# Patient Record
Sex: FEMALE | Race: WHITE | NOT HISPANIC OR LATINO | URBAN - METROPOLITAN AREA
[De-identification: names, ages, dates, MRNs, and addresses within clinical notes are randomized per-mention and may not be internally consistent; named-entity substitution may affect disease eponyms.]

---

## 2024-01-31 ENCOUNTER — LAB OUTSIDE AN ENCOUNTER (OUTPATIENT)
Dept: URBAN - METROPOLITAN AREA CLINIC 105 | Facility: CLINIC | Age: 84
End: 2024-01-31

## 2024-01-31 ENCOUNTER — OFFICE VISIT (OUTPATIENT)
Dept: URBAN - METROPOLITAN AREA CLINIC 105 | Facility: CLINIC | Age: 84
End: 2024-01-31
Payer: COMMERCIAL

## 2024-01-31 ENCOUNTER — TELEPHONE ENCOUNTER (OUTPATIENT)
Dept: URBAN - METROPOLITAN AREA CLINIC 105 | Facility: CLINIC | Age: 84
End: 2024-01-31

## 2024-01-31 VITALS
SYSTOLIC BLOOD PRESSURE: 115 MMHG | WEIGHT: 107.8 LBS | DIASTOLIC BLOOD PRESSURE: 77 MMHG | HEIGHT: 63 IN | BODY MASS INDEX: 19.1 KG/M2 | HEART RATE: 80 BPM | TEMPERATURE: 97.1 F

## 2024-01-31 DIAGNOSIS — R10.13 DYSPEPSIA: ICD-10-CM

## 2024-01-31 DIAGNOSIS — R11.0 NAUSEA: ICD-10-CM

## 2024-01-31 DIAGNOSIS — R18.8 OTHER ASCITES: ICD-10-CM

## 2024-01-31 DIAGNOSIS — R63.0 LOSS OF APPETITE: ICD-10-CM

## 2024-01-31 DIAGNOSIS — R13.19 ESOPHAGEAL DYSPHAGIA: ICD-10-CM

## 2024-01-31 DIAGNOSIS — R14.0 BLOATING: ICD-10-CM

## 2024-01-31 DIAGNOSIS — K76.6 PORTAL HYPERTENSION: ICD-10-CM

## 2024-01-31 DIAGNOSIS — K74.69 OTHER CIRRHOSIS OF LIVER: ICD-10-CM

## 2024-01-31 PROBLEM — 389026000: Status: ACTIVE | Noted: 2024-01-31

## 2024-01-31 PROBLEM — 79890006: Status: ACTIVE | Noted: 2024-01-31

## 2024-01-31 PROBLEM — 34742003: Status: ACTIVE | Noted: 2024-01-31

## 2024-01-31 PROBLEM — 19943007: Status: ACTIVE | Noted: 2024-01-31

## 2024-01-31 PROCEDURE — 99205 OFFICE O/P NEW HI 60 MIN: CPT | Performed by: INTERNAL MEDICINE

## 2024-01-31 RX ORDER — ONDANSETRON 4 MG/1
1 TABLET TABLET, ORALLY DISINTEGRATING ORAL
Qty: 30 | Refills: 3 | OUTPATIENT
Start: 2024-02-01

## 2024-01-31 NOTE — HPI-TODAY'S VISIT:
this delightful 83-year-old lady comes to see me today in our [place] office on the kind referral from Dr. Tracy Chinchilla. the patient is already seen and is established with another gastroenterologist, Dr. Randall.  She has a gastroenterologist.  It is unclear why she comes to see me and has driven all the way from [facility] to do so today.  The story begins in December when she woke up 1 morning and had abdominal distention and pressure.  This went on for several days up to a week.  She eventually went on to have a CT scan of the abdomen and pelvis.  This showed evidence of chronic liver disease and cirrhosis with stigmata of portal hypertension and large volume ascites.  She was then referred to Dr. Randall who did a serological workup negative for autoimmune, viral, and inherited liver diseases.  Patient was started on 25 mg of spironolactone twice daily. -   The patient reports and her  confirms that she has been a lifelong non drinker.  She has never been morbidly obese, hyperlipidemic, or a diabetic.  She further denies any family history of digestive or liver problems.  She does endorse having problems with nausea here lately to the point that she is unable to eat.  She feels like sometimes food gets stuck in the esophagus on the way down.

## 2024-02-01 LAB
HEPATITIS A AB, TOTAL: (no result)
HEPATITIS B SURFACE ANTIBODY QL: (no result)

## 2024-02-02 ENCOUNTER — LAB (OUTPATIENT)
Dept: RURAL CLINIC 8 | Facility: CLINIC | Age: 84
End: 2024-02-02

## 2024-02-09 ENCOUNTER — OV EP (OUTPATIENT)
Dept: RURAL CLINIC 8 | Facility: CLINIC | Age: 84
End: 2024-02-09
Payer: COMMERCIAL

## 2024-02-09 ENCOUNTER — LAB (OUTPATIENT)
Dept: RURAL CLINIC 8 | Facility: CLINIC | Age: 84
End: 2024-02-09

## 2024-02-09 VITALS
SYSTOLIC BLOOD PRESSURE: 130 MMHG | WEIGHT: 95 LBS | HEART RATE: 63 BPM | TEMPERATURE: 98.1 F | HEIGHT: 63 IN | BODY MASS INDEX: 16.83 KG/M2 | DIASTOLIC BLOOD PRESSURE: 76 MMHG

## 2024-02-09 DIAGNOSIS — Z86.010 HISTORY OF COLON POLYPS: ICD-10-CM

## 2024-02-09 DIAGNOSIS — R63.0 LOSS OF APPETITE: ICD-10-CM

## 2024-02-09 DIAGNOSIS — K74.60 UNSPECIFIED CIRRHOSIS OF LIVER: ICD-10-CM

## 2024-02-09 DIAGNOSIS — R68.81 EARLY SATIETY: ICD-10-CM

## 2024-02-09 DIAGNOSIS — K76.6 PORTAL HYPERTENSION: ICD-10-CM

## 2024-02-09 DIAGNOSIS — R18.8 OTHER ASCITES: ICD-10-CM

## 2024-02-09 PROBLEM — 19943007: Status: ACTIVE | Noted: 2024-02-09

## 2024-02-09 PROBLEM — 428283002: Status: ACTIVE | Noted: 2024-02-09

## 2024-02-09 PROCEDURE — 99214 OFFICE O/P EST MOD 30 MIN: CPT | Performed by: INTERNAL MEDICINE

## 2024-02-09 RX ORDER — DORZOLAMIDE HYDROCHLORIDE AND TIMOLOL MALEATE 20; 5 MG/ML; MG/ML
1 DROP INTO AFFECTED EYE SOLUTION/ DROPS OPHTHALMIC TWICE A DAY
Status: ACTIVE | COMMUNITY

## 2024-02-09 RX ORDER — SPIRONOLACTONE 25 MG/1
1 TABLET TABLET, FILM COATED ORAL
Status: ACTIVE | COMMUNITY

## 2024-02-09 RX ORDER — TRIAMCINOLONE ACETONIDE 1 MG/G
1 APPLICATION CREAM TOPICAL
Status: ACTIVE | COMMUNITY

## 2024-02-09 NOTE — HPI-TODAY'S VISIT:
this delightful 83-year-old lady comes to see me today in our [place] office on the kind referral from Dr. Tracy Chinchilla. the patient is already seen and is established with another gastroenterologist, Dr. Randall.  She has a gastroenterologist.  It is unclear why she comes to see me and has driven all the way from [facility] to do so today.  The story begins in December when she woke up 1 morning and had abdominal distention and pressure.  This went on for several days up to a week.  She eventually went on to have a CT scan of the abdomen and pelvis.  This showed evidence of chronic liver disease and cirrhosis with stigmata of portal hypertension and large volume ascites.  She was then referred to Dr. Randall who did a serological workup negative for autoimmune, viral, and inherited liver diseases.  Patient was started on 25 mg of spironolactone twice daily. -   The patient reports and her  confirms that she has been a lifelong non drinker.  She has never been morbidly obese, hyperlipidemic, or a diabetic.  She further denies any family history of digestive or liver problems.  She does endorse having problems with nausea here lately to the point that she is unable to eat.  She feels like sometimes food gets stuck in the esophagus on the way down. - 2/9/2024: patient comes for follow-up with her .  She recently had paracentesis removing 3 L of fluid.  Fluid studies interestingly were not consistent with portal hypertension her SAAG was 0.9.  Her total protein in the fluid was elevated.  Both of these things are not consistent with portal hypertension as the etiology of her ascites.  She has been started on spironolactone daily.  She does not think she has got much reaccumulation of the fluid.  She says her bowels are still kind of slow.  She stopped taking the MiraLax for a while.  Also having some issues with loss of appetite and early satiety

## 2024-02-20 ENCOUNTER — EGD (OUTPATIENT)
Dept: RURAL MEDICAL CENTER 4 | Facility: MEDICAL CENTER | Age: 84
End: 2024-02-20

## 2024-02-20 ENCOUNTER — LAB (OUTPATIENT)
Dept: RURAL MEDICAL CENTER 4 | Facility: MEDICAL CENTER | Age: 84
End: 2024-02-20

## 2024-02-20 DIAGNOSIS — R63.4 UNINTENDED WEIGHT LOSS: ICD-10-CM

## 2024-02-20 DIAGNOSIS — R10.84 GENERALIZED ABDOMINAL PAIN: ICD-10-CM

## 2024-02-20 DIAGNOSIS — R18.8 OTHER ASCITES: ICD-10-CM

## 2024-02-20 DIAGNOSIS — R63.0 ANOREXIA: ICD-10-CM

## 2024-02-20 DIAGNOSIS — R11.0 NAUSEA: ICD-10-CM

## 2024-02-20 PROBLEM — 79890006: Status: ACTIVE | Noted: 2024-02-20

## 2024-02-20 RX ORDER — DORZOLAMIDE HYDROCHLORIDE AND TIMOLOL MALEATE 20; 5 MG/ML; MG/ML
1 DROP INTO AFFECTED EYE SOLUTION/ DROPS OPHTHALMIC TWICE A DAY
Status: ACTIVE | COMMUNITY

## 2024-02-20 RX ORDER — MECLIZINE HYDROCHLORIDE 25 MG/1
1 TABLET TABLET, CHEWABLE ORAL
Qty: 30 | Refills: 3 | Status: ACTIVE | COMMUNITY
Start: 2024-02-16

## 2024-02-20 RX ORDER — SPIRONOLACTONE 25 MG/1
1 TABLET TABLET, FILM COATED ORAL
Status: ACTIVE | COMMUNITY

## 2024-02-20 RX ORDER — TRIAMCINOLONE ACETONIDE 1 MG/G
1 APPLICATION CREAM TOPICAL
Status: ACTIVE | COMMUNITY

## 2024-03-15 ENCOUNTER — OV EP (OUTPATIENT)
Dept: RURAL CLINIC 8 | Facility: CLINIC | Age: 84
End: 2024-03-15
Payer: COMMERCIAL

## 2024-03-15 ENCOUNTER — LAB (OUTPATIENT)
Dept: RURAL CLINIC 8 | Facility: CLINIC | Age: 84
End: 2024-03-15

## 2024-03-15 VITALS
TEMPERATURE: 97.7 F | HEIGHT: 63 IN | WEIGHT: 103 LBS | SYSTOLIC BLOOD PRESSURE: 117 MMHG | HEART RATE: 70 BPM | DIASTOLIC BLOOD PRESSURE: 83 MMHG | BODY MASS INDEX: 18.25 KG/M2

## 2024-03-15 DIAGNOSIS — K74.69 OTHER CIRRHOSIS OF LIVER: ICD-10-CM

## 2024-03-15 DIAGNOSIS — R63.0 LOSS OF APPETITE: ICD-10-CM

## 2024-03-15 DIAGNOSIS — R18.8 OTHER ASCITES: ICD-10-CM

## 2024-03-15 DIAGNOSIS — R11.0 NAUSEA: ICD-10-CM

## 2024-03-15 DIAGNOSIS — K76.6 PORTAL HYPERTENSION: ICD-10-CM

## 2024-03-15 DIAGNOSIS — K59.09 CHRONIC CONSTIPATION: ICD-10-CM

## 2024-03-15 DIAGNOSIS — R14.0 ABDOMINAL DISTENTION: ICD-10-CM

## 2024-03-15 PROCEDURE — 99215 OFFICE O/P EST HI 40 MIN: CPT | Performed by: INTERNAL MEDICINE

## 2024-03-15 RX ORDER — SPIRONOLACTONE 25 MG/1
1 TABLET TABLET, FILM COATED ORAL
Status: ACTIVE | COMMUNITY

## 2024-03-15 RX ORDER — DORZOLAMIDE HYDROCHLORIDE AND TIMOLOL MALEATE 20; 5 MG/ML; MG/ML
1 DROP INTO AFFECTED EYE SOLUTION/ DROPS OPHTHALMIC TWICE A DAY
Status: ACTIVE | COMMUNITY

## 2024-03-15 RX ORDER — SPIRONOLACTONE 50 MG/1
1 TABLET TABLET, FILM COATED ORAL TWICE A DAY
Qty: 60 TABLET | Refills: 5

## 2024-03-15 RX ORDER — TRIAMCINOLONE ACETONIDE 1 MG/G
1 APPLICATION CREAM TOPICAL
Status: ACTIVE | COMMUNITY

## 2024-03-15 RX ORDER — FUROSEMIDE 20 MG/1
1 TABLET TABLET ORAL ONCE A DAY
Qty: 30 | Refills: 5 | OUTPATIENT
Start: 2024-03-15

## 2024-03-15 NOTE — PHYSICAL EXAM PSYCH:
normal mood with appropriate affect Incidental Actinic Keratosis Histology Text: Incidental AK was noted at the periphery of the Mohs section destruction was performed, pt was was advised to monitor, and/or patient was advised to considered topical 5FU once fully healed.

## 2024-03-15 NOTE — PHYSICAL EXAM HENT:
Head, normocephalic, atraumatic, Face, Face within normal limits, Ears, External ears within normal limits, Nose/Nasopharynx, External nose normal appearance, nares patent, no nasal discharge, Mouth and Throat, Oral cavity appearance normal, Lips, Appearance normal thermos

## 2024-03-15 NOTE — HPI-TODAY'S VISIT:
this delightful 83-year-old lady comes to see me today in our [place] office on the kind referral from Dr. Tracy Chinchilla. the patient is already seen and is established with another gastroenterologist, Dr. Randall.  She has a gastroenterologist.  It is unclear why she comes to see me and has driven all the way from [facility] to do so today.  The story begins in December when she woke up 1 morning and had abdominal distention and pressure.  This went on for several days up to a week.  She eventually went on to have a CT scan of the abdomen and pelvis.  This showed evidence of chronic liver disease and cirrhosis with stigmata of portal hypertension and large volume ascites.  She was then referred to Dr. Randall who did a serological workup negative for autoimmune, viral, and inherited liver diseases.  Patient was started on 25 mg of spironolactone twice daily. -   The patient reports and her  confirms that she has been a lifelong non drinker.  She has never been morbidly obese, hyperlipidemic, or a diabetic.  She further denies any family history of digestive or liver problems.  She does endorse having problems with nausea here lately to the point that she is unable to eat.  She feels like sometimes food gets stuck in the esophagus on the way down. - 2/9/2024: patient comes for follow-up with her .  She recently had paracentesis removing 3 L of fluid.  Fluid studies interestingly were not consistent with portal hypertension her SAAG was 0.9.  Her total protein in the fluid was elevated.  Both of these things are not consistent with portal hypertension as the etiology of her ascites.  She has been started on spironolactone daily.  She does not think she has got much reaccumulation of the fluid.  She says her bowels are still kind of slow.  She stopped taking the MiraLax for a while.  Also having some issues with loss of appetite and early satiety - 3/15/2024: EGD done February of 2024. no varices. small H/H. no HP on bx. she comes back to the office today with her .  She tells me that she has reaccumulated ascites.  She says that she still does not have much appetite and food just do not taste like they ought to.
Detail Level: Zone

## 2024-04-09 ENCOUNTER — EGD (OUTPATIENT)
Dept: RURAL MEDICAL CENTER 4 | Facility: MEDICAL CENTER | Age: 84
End: 2024-04-09